# Patient Record
Sex: MALE | Race: WHITE | NOT HISPANIC OR LATINO | Employment: PART TIME | URBAN - METROPOLITAN AREA
[De-identification: names, ages, dates, MRNs, and addresses within clinical notes are randomized per-mention and may not be internally consistent; named-entity substitution may affect disease eponyms.]

---

## 2018-06-07 ENCOUNTER — APPOINTMENT (OUTPATIENT)
Dept: GENERAL RADIOLOGY | Facility: HOSPITAL | Age: 68
End: 2018-06-07

## 2018-06-07 ENCOUNTER — HOSPITAL ENCOUNTER (EMERGENCY)
Facility: HOSPITAL | Age: 68
Discharge: HOME | End: 2018-06-07
Attending: EMERGENCY MEDICINE | Admitting: EMERGENCY MEDICINE

## 2018-06-07 VITALS
OXYGEN SATURATION: 97 % | TEMPERATURE: 97.6 F | DIASTOLIC BLOOD PRESSURE: 87 MMHG | BODY MASS INDEX: 23.1 KG/M2 | RESPIRATION RATE: 16 BRPM | SYSTOLIC BLOOD PRESSURE: 110 MMHG | WEIGHT: 180 LBS | HEART RATE: 99 BPM | HEIGHT: 74 IN

## 2018-06-07 DIAGNOSIS — S80.11XS TRAUMATIC ECCHYMOSIS OF LOWER LEG, RIGHT, SEQUELA: ICD-10-CM

## 2018-06-07 DIAGNOSIS — L03.115 CELLULITIS OF RIGHT LOWER EXTREMITY: ICD-10-CM
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
## 2018-06-07 LAB
INR PPP: 2.64 (ref 0.9–1.1)
PROTHROMBIN TIME: 27.8 SECONDS (ref 11.7–14.2)

## 2018-06-07 PROCEDURE — 73590 X-RAY EXAM OF LOWER LEG: CPT

## 2018-06-07 PROCEDURE — 73610 X-RAY EXAM OF ANKLE: CPT

## 2018-06-07 PROCEDURE — 99283 EMERGENCY DEPT VISIT LOW MDM: CPT

## 2018-06-07 PROCEDURE — 85610 PROTHROMBIN TIME: CPT | Performed by: EMERGENCY MEDICINE

## 2018-06-07 RX ORDER — AMOXICILLIN AND CLAVULANATE POTASSIUM 875; 125 MG/1; MG/1
1 TABLET, FILM COATED ORAL 2 TIMES DAILY
Qty: 14 TABLET | Refills: 0 | Status: SHIPPED | OUTPATIENT
Start: 2018-06-07

## 2018-06-07 NOTE — ED PROVIDER NOTES
" EMERGENCY DEPARTMENT ENCOUNTER    CHIEF COMPLAINT  Chief Complaint: wound to the right leg  History given by: pt  History limited by: nothing  Room Number: 45/45  PMD: James Robert Eisenmenger, MD      HPI:  Pt is a 67 y.o. male who presents complaining of a wound from a dog bite that happened eight days ago and has gradually worsened. Pt states that he went to [facility] seven days ago and was given Amoxicillin and Tdap. Pt also states that he went to his PCP six days ago and was advised to go to the ED if his symptoms worsened. Pt states that the right lower leg swelling and pain of the right ankle [\"it's like bees stinging me\"] has worsened. Pt has no other complains at this time. Pt is currently on Coumadin.    Duration:  Began eight days ago  Onset: sudden  Timing: constant  Location: right lower leg  Quality: dog bite  Intensity/Severity: moderate  Progression: gradually worsened  Associated Symptoms: swelling of the right lower leg and pain of the right ankle  Treatment before arrival: Pt states that he went to [facility] on seven days ago and was given Amoxicillin and Tdap. Pt also states that he went to his PCP on Friday and was advised to go to the ED if his symptoms worsened.     PAST MEDICAL HISTORY  Active Ambulatory Problems     Diagnosis Date Noted   • No Active Ambulatory Problems     Resolved Ambulatory Problems     Diagnosis Date Noted   • No Resolved Ambulatory Problems     Past Medical History:   Diagnosis Date   • Allergic    • Arthritis    • Cancer    • Gallbladder abscess    • Heart murmur    • Kidney stone    • Migraines    • NORMAN (stress urinary incontinence), male        PAST SURGICAL HISTORY  Past Surgical History:   Procedure Laterality Date   • HERNIA REPAIR     • KNEE ACL RECONSTRUCTION Left    • MIDDLE EAR SURGERY     • TONSILLECTOMY         FAMILY HISTORY  Family History   Problem Relation Age of Onset   • Heart disease Mother    • Cancer Father    • Heart disease Maternal Grandmother    • Heart " disease Maternal Grandfather        SOCIAL HISTORY  Social History     Social History   • Marital status:      Spouse name: N/A   • Number of children: N/A   • Years of education: N/A     Occupational History   • Not on file.     Social History Main Topics   • Smoking status: Never Smoker   • Smokeless tobacco: Not on file   • Alcohol use No   • Drug use: No   • Sexual activity: Not on file     Other Topics Concern   • Not on file     Social History Narrative   • No narrative on file       ALLERGIES  Patient has no known allergies.    REVIEW OF SYSTEMS  Review of Systems   Constitutional: Negative for activity change, appetite change and fever.   HENT: Negative for congestion and sore throat.    Eyes: Negative.    Respiratory: Negative for cough and shortness of breath.    Cardiovascular: Positive for leg swelling (of the right lower leg). Negative for chest pain.   Gastrointestinal: Negative for abdominal pain, diarrhea and vomiting.   Endocrine: Negative.    Genitourinary: Negative for decreased urine volume and dysuria.   Musculoskeletal: Positive for arthralgias (right ankle). Negative for neck pain.   Skin: Positive for wound (dog bite to the right lower leg). Negative for rash.   Allergic/Immunologic: Negative.    Neurological: Negative for weakness, numbness and headaches.   Hematological: Negative.    Psychiatric/Behavioral: Negative.    All other systems reviewed and are negative.      PHYSICAL EXAM  ED Triage Vitals [06/07/18 1622]   Temp Heart Rate Resp BP SpO2   97.6 °F (36.4 °C) 99 16 -- 97 %      Temp src Heart Rate Source Patient Position BP Location FiO2 (%)   -- -- -- -- --       Physical Exam   Constitutional: He is oriented to person, place, and time. No distress.   HENT:   Head: Normocephalic and atraumatic.   Eyes: EOM are normal. Pupils are equal, round, and reactive to light.   Neck: Normal range of motion. Neck supple.   Cardiovascular: Normal rate, regular rhythm, normal heart sounds  and intact distal pulses.  Exam reveals no gallop and no friction rub.    No murmur heard.  Pulmonary/Chest: Effort normal and breath sounds normal. No respiratory distress. He has no wheezes. He has no rhonchi. He has no rales.   Abdominal: Soft. There is no tenderness. There is no rebound and no guarding.   Musculoskeletal: Normal range of motion. He exhibits edema (of the RLE).   Neurological: He is alert and oriented to person, place, and time. He has normal sensation and normal strength.   Skin: Skin is warm and dry. Ecchymosis (over the RLE) noted.   Puncture wound about mid calf on the lateral side with an area of swelling underneath. Inferior to that area is a similar puncture wound without swelling. There are no signs of cellulitis.   Psychiatric: Mood and affect normal.   Nursing note and vitals reviewed.      LAB RESULTS  Lab Results (last 24 hours)     Procedure Component Value Units Date/Time    Protime-INR [[id number]]  (Abnormal) Collected:  06/07/18 1715    Specimen:  Blood Updated:  06/07/18 1745     Protime 27.8 (H) Seconds      INR 2.64 (H)          I ordered the above labs and reviewed the results    RADIOLOGY  XR Tibia Fibula 2 View Right   Final Result       Soft tissue swelling. No acute fracture or erosion identified. If there   is clinical suspicion for radiographically occult osteomyelitis, MRI   could be considered for further evaluation.       This report was finalized on 6/7/2018 5:17 PM by Dr. Roque Sanchez M.D.          XR Ankle 3+ View Right   Final Result       Soft tissue swelling. No acute fracture or erosion identified. If there   is clinical suspicion for radiographically occult osteomyelitis, MRI   could be considered for further evaluation.       This report was finalized on 6/7/2018 5:17 PM by Dr. Roque Sanchez M.D.               I ordered the above noted radiological studies. Interpreted by radiologist. Reviewed by me in PACS.        PROCEDURES  Procedures      PROGRESS AND CONSULTS  1656 Ordered Right Tibia Fibula XR, Right Ankle XR, and Protime-INR for further evaluation.    1729 Rechecked pt who is in NAD. Discussed unremarkable lab imaging.    1804I'm suspicious that the firm area could be a developing abscess, so we disussed needle aspiration.  He agreed to proceed.  Injected lidocaine into the wound for anesthesia. Also aspirated the swollen area but there was no blood or pus that drained. Discussed results with the pt. Also discussed the plan for discharge. Advised the pt to stop the Amoxicillin [and start the Augmentin] and to wear compression stockings. Most of his pain is from the swelling and soft tissue damage associated with the dog bite.  Augmentin will offer broader coverage.  Pt understands and agrees with the plan, all questions answered.        MEDICAL DECISION MAKING  Results were reviewed/discussed with the patient and they were also made aware of online access. Pt also made aware that some labs, such as cultures, will not be resulted during ER visit and follow up with PMD is necessary.     MDM  Number of Diagnoses or Management Options     Amount and/or Complexity of Data Reviewed  Clinical lab tests: reviewed and ordered (Protime - 27.8 and INR - 2.64)  Tests in the radiology section of CPT®: reviewed and ordered (R Tibia Fibula and R Ankle XRs show soft tissue swelling. No acute fracture or erosion identified.)  Decide to obtain previous medical records or to obtain history from someone other than the patient: yes  Review and summarize past medical records: yes (The patient has no pertinent previous ED visits.)  Independent visualization of images, tracings, or specimens: yes    Patient Progress  Patient progress: stable         DIAGNOSIS  Final diagnoses:   Dog bite, initial encounter   Traumatic ecchymosis of lower leg, right, sequela   Cellulitis of right lower extremity       DISPOSITION  DISCHARGE    Patient  discharged in stable condition.    Reviewed implications of results, diagnosis, meds, responsibility to follow up, warning signs and symptoms of possible worsening, potential complications and reasons to return to ER, including any new or worsening symptoms.    Patient/Family voiced understanding of above instructions.    Discussed plan for discharge, as there is no emergent indication for admission. Patient referred to primary care provider for BP management due to today's BP. Pt/family is agreeable and understands need for follow up and repeat testing.  Pt is aware that discharge does not mean that nothing is wrong but it indicates no emergency is present that requires admission and they must continue care with follow-up as given below or physician of their choice.     FOLLOW-UP  No follow-up provider specified.       Medication List      New Prescriptions    amoxicillin-clavulanate 875-125 MG per tablet  Commonly known as:  AUGMENTIN  Take 1 tablet by mouth 2 (Two) Times a Day.        Stop    cephalexin 500 MG capsule  Commonly known as:  KEFLEX              Latest Documented Vital Signs:  As of 6:12 PM  BP- 110/87 HR- 99 Temp- 97.6 °F (36.4 °C) O2 sat- 97%    --  Documentation assistance provided by ella Coburn for Dr. Johnson.  Information recorded by the ella was done at my direction and has been verified and validated by me.       Erna Coburn  06/07/18 2173       Alberto Johnson MD  06/07/18 1408

## 2018-06-07 NOTE — DISCHARGE INSTRUCTIONS
Stop amoxicillin.  Start augmentin as prescribed.  Keep your leg elevated above the level of your heart as much as possible.  Wear a compression stocking as discussed.

## 2021-03-22 ENCOUNTER — BULK ORDERING (OUTPATIENT)
Dept: CASE MANAGEMENT | Facility: OTHER | Age: 71
End: 2021-03-22

## 2021-03-22 DIAGNOSIS — Z23 IMMUNIZATION DUE: ICD-10-CM
